# Patient Record
Sex: FEMALE | Race: WHITE | NOT HISPANIC OR LATINO
[De-identification: names, ages, dates, MRNs, and addresses within clinical notes are randomized per-mention and may not be internally consistent; named-entity substitution may affect disease eponyms.]

---

## 2020-02-20 PROBLEM — Z00.00 ENCOUNTER FOR PREVENTIVE HEALTH EXAMINATION: Status: ACTIVE | Noted: 2020-02-20

## 2020-04-14 ENCOUNTER — APPOINTMENT (OUTPATIENT)
Dept: INTERNAL MEDICINE | Facility: CLINIC | Age: 80
End: 2020-04-14

## 2021-01-20 ENCOUNTER — INPATIENT (INPATIENT)
Facility: HOSPITAL | Age: 81
LOS: 2 days | Discharge: HOME | DRG: 989 | End: 2021-01-23
Attending: HOSPITALIST | Admitting: HOSPITALIST
Payer: MEDICARE

## 2021-01-20 VITALS
DIASTOLIC BLOOD PRESSURE: 75 MMHG | HEIGHT: 63 IN | TEMPERATURE: 99 F | HEART RATE: 86 BPM | RESPIRATION RATE: 16 BRPM | SYSTOLIC BLOOD PRESSURE: 166 MMHG | OXYGEN SATURATION: 100 % | WEIGHT: 134.92 LBS

## 2021-01-20 LAB
ALBUMIN SERPL ELPH-MCNC: 3.8 G/DL — SIGNIFICANT CHANGE UP (ref 3.5–5)
ALP SERPL-CCNC: 67 U/L — SIGNIFICANT CHANGE UP (ref 40–120)
ALT FLD-CCNC: 21 U/L DA — SIGNIFICANT CHANGE UP (ref 10–60)
ANION GAP SERPL CALC-SCNC: 6 MMOL/L — SIGNIFICANT CHANGE UP (ref 5–17)
APPEARANCE UR: CLEAR — SIGNIFICANT CHANGE UP
APTT BLD: 24.7 SEC — LOW (ref 27.5–35.5)
AST SERPL-CCNC: 16 U/L — SIGNIFICANT CHANGE UP (ref 10–40)
BACTERIA # UR AUTO: ABNORMAL /HPF
BASOPHILS # BLD AUTO: 0.06 K/UL — SIGNIFICANT CHANGE UP (ref 0–0.2)
BASOPHILS NFR BLD AUTO: 0.7 % — SIGNIFICANT CHANGE UP (ref 0–2)
BILIRUB SERPL-MCNC: 0.3 MG/DL — SIGNIFICANT CHANGE UP (ref 0.2–1.2)
BILIRUB UR-MCNC: NEGATIVE — SIGNIFICANT CHANGE UP
BLD GP AB SCN SERPL QL: SIGNIFICANT CHANGE UP
BUN SERPL-MCNC: 25 MG/DL — HIGH (ref 7–18)
CALCIUM SERPL-MCNC: 9 MG/DL — SIGNIFICANT CHANGE UP (ref 8.4–10.5)
CHLORIDE SERPL-SCNC: 106 MMOL/L — SIGNIFICANT CHANGE UP (ref 96–108)
CO2 SERPL-SCNC: 28 MMOL/L — SIGNIFICANT CHANGE UP (ref 22–31)
COLOR SPEC: YELLOW — SIGNIFICANT CHANGE UP
CREAT SERPL-MCNC: 0.8 MG/DL — SIGNIFICANT CHANGE UP (ref 0.5–1.3)
DIFF PNL FLD: ABNORMAL
EOSINOPHIL # BLD AUTO: 0.16 K/UL — SIGNIFICANT CHANGE UP (ref 0–0.5)
EOSINOPHIL NFR BLD AUTO: 1.8 % — SIGNIFICANT CHANGE UP (ref 0–6)
EPI CELLS # UR: ABNORMAL /HPF
GLUCOSE SERPL-MCNC: 111 MG/DL — HIGH (ref 70–99)
GLUCOSE UR QL: NEGATIVE — SIGNIFICANT CHANGE UP
HCT VFR BLD CALC: 41.4 % — SIGNIFICANT CHANGE UP (ref 34.5–45)
HGB BLD-MCNC: 13.3 G/DL — SIGNIFICANT CHANGE UP (ref 11.5–15.5)
IMM GRANULOCYTES NFR BLD AUTO: 0.3 % — SIGNIFICANT CHANGE UP (ref 0–1.5)
INR BLD: 1.17 RATIO — HIGH (ref 0.88–1.16)
KETONES UR-MCNC: NEGATIVE — SIGNIFICANT CHANGE UP
LEUKOCYTE ESTERASE UR-ACNC: NEGATIVE — SIGNIFICANT CHANGE UP
LYMPHOCYTES # BLD AUTO: 1.49 K/UL — SIGNIFICANT CHANGE UP (ref 1–3.3)
LYMPHOCYTES # BLD AUTO: 16.7 % — SIGNIFICANT CHANGE UP (ref 13–44)
MCHC RBC-ENTMCNC: 28.7 PG — SIGNIFICANT CHANGE UP (ref 27–34)
MCHC RBC-ENTMCNC: 32.1 GM/DL — SIGNIFICANT CHANGE UP (ref 32–36)
MCV RBC AUTO: 89.4 FL — SIGNIFICANT CHANGE UP (ref 80–100)
MONOCYTES # BLD AUTO: 0.63 K/UL — SIGNIFICANT CHANGE UP (ref 0–0.9)
MONOCYTES NFR BLD AUTO: 7.1 % — SIGNIFICANT CHANGE UP (ref 2–14)
NEUTROPHILS # BLD AUTO: 6.55 K/UL — SIGNIFICANT CHANGE UP (ref 1.8–7.4)
NEUTROPHILS NFR BLD AUTO: 73.4 % — SIGNIFICANT CHANGE UP (ref 43–77)
NITRITE UR-MCNC: NEGATIVE — SIGNIFICANT CHANGE UP
NRBC # BLD: 0 /100 WBCS — SIGNIFICANT CHANGE UP (ref 0–0)
PH UR: 6.5 — SIGNIFICANT CHANGE UP (ref 5–8)
PLATELET # BLD AUTO: 193 K/UL — SIGNIFICANT CHANGE UP (ref 150–400)
POTASSIUM SERPL-MCNC: 4.2 MMOL/L — SIGNIFICANT CHANGE UP (ref 3.5–5.3)
POTASSIUM SERPL-SCNC: 4.2 MMOL/L — SIGNIFICANT CHANGE UP (ref 3.5–5.3)
PROT SERPL-MCNC: 8.3 G/DL — SIGNIFICANT CHANGE UP (ref 6–8.3)
PROT UR-MCNC: NEGATIVE — SIGNIFICANT CHANGE UP
PROTHROM AB SERPL-ACNC: 13.8 SEC — HIGH (ref 10.6–13.6)
RBC # BLD: 4.63 M/UL — SIGNIFICANT CHANGE UP (ref 3.8–5.2)
RBC # FLD: 14.6 % — HIGH (ref 10.3–14.5)
RBC CASTS # UR COMP ASSIST: ABNORMAL /HPF (ref 0–2)
SARS-COV-2 RNA SPEC QL NAA+PROBE: SIGNIFICANT CHANGE UP
SODIUM SERPL-SCNC: 140 MMOL/L — SIGNIFICANT CHANGE UP (ref 135–145)
SP GR SPEC: 1.01 — SIGNIFICANT CHANGE UP (ref 1.01–1.02)
TROPONIN I SERPL-MCNC: <0.015 NG/ML — SIGNIFICANT CHANGE UP (ref 0–0.04)
UROBILINOGEN FLD QL: NEGATIVE — SIGNIFICANT CHANGE UP
WBC # BLD: 8.92 K/UL — SIGNIFICANT CHANGE UP (ref 3.8–10.5)
WBC # FLD AUTO: 8.92 K/UL — SIGNIFICANT CHANGE UP (ref 3.8–10.5)
WBC UR QL: SIGNIFICANT CHANGE UP /HPF (ref 0–5)

## 2021-01-20 PROCEDURE — 70450 CT HEAD/BRAIN W/O DYE: CPT | Mod: 26

## 2021-01-20 RX ORDER — HALOPERIDOL DECANOATE 100 MG/ML
5 INJECTION INTRAMUSCULAR ONCE
Refills: 0 | Status: COMPLETED | OUTPATIENT
Start: 2021-01-20 | End: 2021-01-20

## 2021-01-20 RX ORDER — LIDOCAINE HYDROCHLORIDE AND EPINEPHRINE 10; 10 MG/ML; UG/ML
20 INJECTION, SOLUTION INFILTRATION; PERINEURAL ONCE
Refills: 0 | Status: COMPLETED | OUTPATIENT
Start: 2021-01-20 | End: 2021-01-20

## 2021-01-20 RX ADMIN — Medication 1 MILLIGRAM(S): at 21:21

## 2021-01-20 RX ADMIN — LIDOCAINE HYDROCHLORIDE AND EPINEPHRINE 20 MILLILITER(S): 10; 10 INJECTION, SOLUTION INFILTRATION; PERINEURAL at 17:51

## 2021-01-20 RX ADMIN — HALOPERIDOL DECANOATE 5 MILLIGRAM(S): 100 INJECTION INTRAMUSCULAR at 20:05

## 2021-01-20 NOTE — ED PROVIDER NOTE - CLINICAL SUMMARY MEDICAL DECISION MAKING FREE TEXT BOX
79 yo F s/p presumed mechanical fall. patient neurologically intact and ambulatory in the ED. Patient with billy-orbital hematoma and ecchymosis. CT with [facility]. Discussed with Neurosurgery and Trauma and [place] and currently no beds. Films reviewed and recommend repeat CAT scan in 6 hours. If worsening, can transfer. If unchanged, can admit to  and neurosurgery will come to  to [facility] tomorrow. Plan of care signed out to Dr. Phelps.

## 2021-01-20 NOTE — ED ADULT NURSE NOTE - CHIEF COMPLAINT QUOTE
S/p fall on sidewalk with laceration to left eyebrow and edema/ ecchymosis to left side of face. Pt unsure as to how she fell. elle friend [phone]

## 2021-01-20 NOTE — ED PROVIDER NOTE - OBJECTIVE STATEMENT
81 y/o F pt with no significant PMHx and no significant PSHx presents to the ED with c/o mechanical fall after tripping on a pebble today when going to her ENT office. As per EMS, patient was found with a left eyebrow laceration and ecchymosis. Patient was confused at the scene. Patient states not having pain except on her face. Patient denies any dizziness, photophobia, blurry vision, fevers, nausea, emesis, chest pain, shortness of breath, abdominal pain, dysuria, hematuria, diarrhea, constipation, or any other acute complaints. 79 y/o F pt with no significant PMHx and no significant PSHx presents to the ED with c/o mechanical fall after tripping on a pebble today when going to her ENT office. As per EMS, patient was found with a left eyebrow laceration and ecchymosis. EMS reports patient was confused at the scene. Patient states not having pain except on her face where her laceration is. Patient denies any dizziness, photophobia, blurry vision, fevers, nausea, emesis, chest pain, shortness of breath, abdominal pain, dysuria, hematuria, diarrhea, constipation, or any other acute complaints.

## 2021-01-20 NOTE — ED ADULT TRIAGE NOTE - CHIEF COMPLAINT QUOTE
S/p fall on sidewalk with laceration to left eyebrow and edema to left side of face. Pt unsure as to how she fell. elle friend [phone] S/p fall on sidewalk with laceration to left eyebrow and edema/ ecchymosis to left side of face. Pt unsure as to how she fell. elle friend [phone]

## 2021-01-20 NOTE — ED ADULT NURSE NOTE - OBJECTIVE STATEMENT
pt presents to ed s/p mechanical fall. pt endorses walking in the street and endorses falling but doesn't remember the mechanism. Endorses weak ankles b/l. Denies LOC., Pt sustained Lac to L eyebrow, contusion to L eye and swelling to L side of face. Denies blood thinners or LOC. Denies any other complaints.

## 2021-01-20 NOTE — ED ADULT NURSE NOTE - NSIMPLEMENTINTERV_GEN_ALL_ED
Implemented All Fall Risk Interventions:  [place] to call system. Call bell, personal items and telephone within reach. Instruct patient to call for assistance. Room bathroom lighting operational. Non-slip footwear when patient is off stretcher. Physically safe environment: no spills, clutter or unnecessary equipment. Stretcher in lowest position, wheels locked, appropriate side rails in place. Provide visual cue, wrist band, yellow gown, etc. Monitor gait and stability. Monitor for mental status changes and reorient to person, place, and time. Review medications for side effects contributing to fall risk. Reinforce activity limits and safety measures with patient and family.

## 2021-01-20 NOTE — ED PROVIDER NOTE - PROGRESS NOTE DETAILS
discussed with trauma, dr. Bebeto Perera and will discuss with neurosurgery, no beds at [place] discussed with neurosurgery, Dr. Geller, recommends repeat CAT scan in 6 hours. If unchanged, can admit to medicine at  and NS will come to [facility] to eval. If worsening will transfer to [place]. discussed with trauma, dr. Spangler and will discuss with neurosurgery, no beds at [place] discussed with neurosurgery, Dr. Geller, recommends repeat CAT scan in 6 hours. If unchanged, can admit to medicine at  and NS will come to [facility] to eval. Physician or PA will come to eval. If worsening will transfer to [place]. Patient agitated and roaming around the ED. disrupting nurses and physicians. Given haldol and ativan. patient sleeping soundly in bed. Plan of care signed out ot Dr. maddox. repeat CT head unchanged. will admit to medicine at  for observation.

## 2021-01-20 NOTE — ED PROVIDER NOTE - CHPI ED SYMPTOMS NEG
no dizziness, no photophobia, no blurry vision, no fever, no nausea, no emesis, no chest pain, no shortness of breath, no abdominal pain, no dysuria, no hematuria, no diarrhea, no constipation

## 2021-01-20 NOTE — ED ADULT NURSE NOTE - ED STAT RN HANDOFF DETAILS
pt.remained stable denies pain. admitted to [facility] for subarcnoid hemorrhage/facial laceration. on CM with NSR.  transfer to holding area. report given to david rogers.not in distress

## 2021-01-20 NOTE — ED PROVIDER NOTE - SKIN WOUND DESCRIPTION
left eyebrow and eyebrow with hematoma and ecchymosis, 2 cm laceration above the upper eyebrow 1.5cm left eyebrow laceration under left lateral eyebrow, surrounding ecchymosis and hematoma

## 2021-01-20 NOTE — ED ADULT TRIAGE NOTE - SPO2 (%)
Problem: Patient Care Overview  Goal: Plan of Care/Patient Progress Review  Pt is A&O x4. CMS intact. Bowel sounds audible. VSS. Dressing CDI, marked. NISREEN drain patent. Fountain also patent with 700 mL output. Up with 2 assist and GB. C/o Back and neck pain, decreased with scheduled Tylenol and PCA dilaudid. Has multiple skin tears/bruises on all extremities. Plan is wound consult today as well as PT/ OT.          100

## 2021-01-21 DIAGNOSIS — I60.9 NONTRAUMATIC SUBARACHNOID HEMORRHAGE, UNSPECIFIED: ICD-10-CM
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
## 2021-01-21 DIAGNOSIS — Z29.9 ENCOUNTER FOR PROPHYLACTIC MEASURES, UNSPECIFIED: ICD-10-CM

## 2021-01-21 LAB
A1C WITH ESTIMATED AVERAGE GLUCOSE RESULT: 5.6 % — SIGNIFICANT CHANGE UP (ref 4–5.6)
ALBUMIN SERPL ELPH-MCNC: 3.5 G/DL — SIGNIFICANT CHANGE UP (ref 3.5–5)
ALP SERPL-CCNC: 64 U/L — SIGNIFICANT CHANGE UP (ref 40–120)
ALT FLD-CCNC: 20 U/L DA — SIGNIFICANT CHANGE UP (ref 10–60)
ANION GAP SERPL CALC-SCNC: 9 MMOL/L — SIGNIFICANT CHANGE UP (ref 5–17)
AST SERPL-CCNC: 18 U/L — SIGNIFICANT CHANGE UP (ref 10–40)
BASOPHILS # BLD AUTO: 0.06 K/UL — SIGNIFICANT CHANGE UP (ref 0–0.2)
BASOPHILS NFR BLD AUTO: 0.6 % — SIGNIFICANT CHANGE UP (ref 0–2)
BILIRUB SERPL-MCNC: 0.5 MG/DL — SIGNIFICANT CHANGE UP (ref 0.2–1.2)
BUN SERPL-MCNC: 16 MG/DL — SIGNIFICANT CHANGE UP (ref 7–18)
CALCIUM SERPL-MCNC: 8.6 MG/DL — SIGNIFICANT CHANGE UP (ref 8.4–10.5)
CHLORIDE SERPL-SCNC: 107 MMOL/L — SIGNIFICANT CHANGE UP (ref 96–108)
CHOLEST SERPL-MCNC: 185 MG/DL — SIGNIFICANT CHANGE UP
CK MB BLD-MCNC: 1.7 % — SIGNIFICANT CHANGE UP (ref 0–3.5)
CK MB CFR SERPL CALC: 1.9 NG/ML — SIGNIFICANT CHANGE UP (ref 0–3.6)
CK SERPL-CCNC: 115 U/L — SIGNIFICANT CHANGE UP (ref 21–215)
CO2 SERPL-SCNC: 23 MMOL/L — SIGNIFICANT CHANGE UP (ref 22–31)
CREAT SERPL-MCNC: 0.62 MG/DL — SIGNIFICANT CHANGE UP (ref 0.5–1.3)
CULTURE RESULTS: SIGNIFICANT CHANGE UP
EOSINOPHIL # BLD AUTO: 0.05 K/UL — SIGNIFICANT CHANGE UP (ref 0–0.5)
EOSINOPHIL NFR BLD AUTO: 0.5 % — SIGNIFICANT CHANGE UP (ref 0–6)
ESTIMATED AVERAGE GLUCOSE: 114 MG/DL — SIGNIFICANT CHANGE UP (ref 68–114)
FOLATE SERPL-MCNC: >20 NG/ML — SIGNIFICANT CHANGE UP
GLUCOSE SERPL-MCNC: 111 MG/DL — HIGH (ref 70–99)
HCT VFR BLD CALC: 38.8 % — SIGNIFICANT CHANGE UP (ref 34.5–45)
HDLC SERPL-MCNC: 77 MG/DL — SIGNIFICANT CHANGE UP
HGB BLD-MCNC: 12.6 G/DL — SIGNIFICANT CHANGE UP (ref 11.5–15.5)
IMM GRANULOCYTES NFR BLD AUTO: 0.6 % — SIGNIFICANT CHANGE UP (ref 0–1.5)
INR BLD: 1.22 RATIO — HIGH (ref 0.88–1.16)
LIPID PNL WITH DIRECT LDL SERPL: 94 MG/DL — SIGNIFICANT CHANGE UP
LYMPHOCYTES # BLD AUTO: 1.07 K/UL — SIGNIFICANT CHANGE UP (ref 1–3.3)
LYMPHOCYTES # BLD AUTO: 10.4 % — LOW (ref 13–44)
MAGNESIUM SERPL-MCNC: 2.2 MG/DL — SIGNIFICANT CHANGE UP (ref 1.6–2.6)
MCHC RBC-ENTMCNC: 29.1 PG — SIGNIFICANT CHANGE UP (ref 27–34)
MCHC RBC-ENTMCNC: 32.5 GM/DL — SIGNIFICANT CHANGE UP (ref 32–36)
MCV RBC AUTO: 89.6 FL — SIGNIFICANT CHANGE UP (ref 80–100)
MONOCYTES # BLD AUTO: 0.5 K/UL — SIGNIFICANT CHANGE UP (ref 0–0.9)
MONOCYTES NFR BLD AUTO: 4.9 % — SIGNIFICANT CHANGE UP (ref 2–14)
NEUTROPHILS # BLD AUTO: 8.54 K/UL — HIGH (ref 1.8–7.4)
NEUTROPHILS NFR BLD AUTO: 83 % — HIGH (ref 43–77)
NON HDL CHOLESTEROL: 108 MG/DL — SIGNIFICANT CHANGE UP
NRBC # BLD: 0 /100 WBCS — SIGNIFICANT CHANGE UP (ref 0–0)
PHOSPHATE SERPL-MCNC: 2.6 MG/DL — SIGNIFICANT CHANGE UP (ref 2.5–4.5)
PLATELET # BLD AUTO: 177 K/UL — SIGNIFICANT CHANGE UP (ref 150–400)
POTASSIUM SERPL-MCNC: 3.8 MMOL/L — SIGNIFICANT CHANGE UP (ref 3.5–5.3)
POTASSIUM SERPL-SCNC: 3.8 MMOL/L — SIGNIFICANT CHANGE UP (ref 3.5–5.3)
PROLACTIN SERPL-MCNC: 27.2 NG/ML — HIGH (ref 3.4–24.1)
PROT SERPL-MCNC: 7.5 G/DL — SIGNIFICANT CHANGE UP (ref 6–8.3)
PROTHROM AB SERPL-ACNC: 14.4 SEC — HIGH (ref 10.6–13.6)
RBC # BLD: 4.33 M/UL — SIGNIFICANT CHANGE UP (ref 3.8–5.2)
RBC # FLD: 14.3 % — SIGNIFICANT CHANGE UP (ref 10.3–14.5)
SARS-COV-2 IGG SERPL QL IA: NEGATIVE — SIGNIFICANT CHANGE UP
SARS-COV-2 IGM SERPL IA-ACNC: <0.1 INDEX — SIGNIFICANT CHANGE UP
SODIUM SERPL-SCNC: 139 MMOL/L — SIGNIFICANT CHANGE UP (ref 135–145)
SPECIMEN SOURCE: SIGNIFICANT CHANGE UP
TRIGL SERPL-MCNC: 68 MG/DL — SIGNIFICANT CHANGE UP
TROPONIN I SERPL-MCNC: <0.015 NG/ML — SIGNIFICANT CHANGE UP (ref 0–0.04)
TSH SERPL-MCNC: 1.7 UU/ML — SIGNIFICANT CHANGE UP (ref 0.34–4.82)
VIT B12 SERPL-MCNC: 850 PG/ML — SIGNIFICANT CHANGE UP (ref 232–1245)
WBC # BLD: 10.28 K/UL — SIGNIFICANT CHANGE UP (ref 3.8–10.5)
WBC # FLD AUTO: 10.28 K/UL — SIGNIFICANT CHANGE UP (ref 3.8–10.5)

## 2021-01-21 PROCEDURE — 70450 CT HEAD/BRAIN W/O DYE: CPT | Mod: 26

## 2021-01-21 PROCEDURE — 99285 EMERGENCY DEPT VISIT HI MDM: CPT

## 2021-01-21 PROCEDURE — 99222 1ST HOSP IP/OBS MODERATE 55: CPT

## 2021-01-21 RX ORDER — ONDANSETRON 8 MG/1
4 TABLET, FILM COATED ORAL EVERY 6 HOURS
Refills: 0 | Status: DISCONTINUED | OUTPATIENT
Start: 2021-01-21 | End: 2021-01-23

## 2021-01-21 RX ORDER — PANTOPRAZOLE SODIUM 20 MG/1
40 TABLET, DELAYED RELEASE ORAL
Refills: 0 | Status: DISCONTINUED | OUTPATIENT
Start: 2021-01-21 | End: 2021-01-23

## 2021-01-21 RX ORDER — SODIUM CHLORIDE 9 MG/ML
1000 INJECTION, SOLUTION INTRAVENOUS
Refills: 0 | Status: DISCONTINUED | OUTPATIENT
Start: 2021-01-21 | End: 2021-01-21

## 2021-01-21 RX ADMIN — PANTOPRAZOLE SODIUM 40 MILLIGRAM(S): 20 TABLET, DELAYED RELEASE ORAL at 05:37

## 2021-01-21 RX ADMIN — SODIUM CHLORIDE 100 MILLILITER(S): 9 INJECTION, SOLUTION INTRAVENOUS at 04:00

## 2021-01-21 NOTE — H&P ADULT - HISTORY OF PRESENT ILLNESS
81 y/o female from home, lives with a friend, no family, independent, with no significant medical history and no significant PSHx presents to the ED with c/o mechanical fall after tripping on a pebble today when walking on street. As per EMS, patient was found with a left eyebrow laceration and ecchymosis. EMS reports patient was confused at the scene. At present, patient is drowsy, says she is sleepy, feels like as if she was drugged. She states she has headache and feels nauseous. She denies taking any medications at home. Patient was planning on going to PMD on monday, hasn not seen doctor in many years. Denies having lightheadedness, vertigo, palpitations, chest pain before falling. Patient denies any dizziness, photophobia, blurry vision, fevers, nausea, emesis, chest pain, shortness of breath, abdominal pain, dysuria, hematuria, diarrhea, constipation.

## 2021-01-21 NOTE — H&P ADULT - PROBLEM SELECTOR PLAN 2
Presented after a fall  She states she tripped, no prodromal symptom  Has ecchymosis of left eyelid  Will do PT

## 2021-01-21 NOTE — H&P ADULT - PROBLEM SELECTOR PLAN 1
Presented after a fall, hit head, had laceration of left eyelid  Has ecchymosis of left eyelid  Patient is lethargic at present, received haldol and ativan in ed earlier as she was walking around.  CT head showed subarachnoid hgg, repeat Ct was done after few hours, which shows stable hgg compared to previous.   Will monitor on tele   Started on IV fluids  NPO for now as she is lethargic and nauseous  neuro Dr Ovalle

## 2021-01-21 NOTE — PATIENT PROFILE ADULT - NS PRO AD NO ADVANCE DIRECTIVE
Aperture Size (Optional): D No Number Of Freeze-Thaw Cycles: 1 freeze-thaw cycle Detail Level: Detailed Render Note In Bullet Format When Appropriate: No Post-Care Instructions: I reviewed with the patient in detail post-care instructions. Patient is to wear sunprotection, and avoid picking at any of the treated lesions. Pt may apply Vaseline to crusted or scabbing areas. Consent: The patient's consent was obtained including but not limited to risks of crusting, scabbing, blistering, scarring, darker or lighter pigmentary change, recurrence, incomplete removal and infection. Duration Of Freeze Thaw-Cycle (Seconds): 5

## 2021-01-21 NOTE — H&P ADULT - NSHPPHYSICALEXAM_GEN_ALL_CORE
Vital Signs (24 Hrs):  T(C): 36.6 (01-20-21 @ 20:44), Max: 37.4 (01-20-21 @ 16:27)  HR: 85 (01-20-21 @ 20:44) (85 - 86)  BP: 130/81 (01-20-21 @ 20:44) (130/81 - 166/75)  RR: 17 (01-20-21 @ 20:44) (16 - 17)  SpO2: 96% (01-20-21 @ 20:44) (96% - 100%)

## 2021-01-21 NOTE — CONSULT NOTE ADULT - ASSESSMENT
Extrapyramidal type gait disorder causing postural unsteadiness and fall; SDH without significant pressure effect; plan follow CT head; physical therpay for gait training; advise strongly walker (rollator 3 wheeled walker)

## 2021-01-21 NOTE — H&P ADULT - ASSESSMENT
79 y/o female from home, lives with a friend, no family, independent, with no significant medical history and no significant PSHx presents to the ED with c/o mechanical fall after tripping on a pebble today when walking on street.      ED:  CT head showed scattered areas of subarachnoid hemorrhage along both hemispheres right greater than left. No subdural or epidural hematoma.  Repeat Ct head showed similar findings.  Tele Neuro surgery was consulted. As per ED Attending note "discussed with trauma, dr. Spangler and will discuss with neurosurgery [Dr. Ash] neurosurgery will come to  to eval tomorrow".  She received haldol and ativan in ED as she was talking more.     Patient is being admitted to tele for monitoring of Subarachnoid hemorrhage.

## 2021-01-21 NOTE — CONSULT NOTE ADULT - SUBJECTIVE AND OBJECTIVE BOX
Patient is a 80y old  Female who presents with a chief complaint of fall (21 Jan 2021 02:21)      HPI: Fall when heading to ENT resulting in left periorbital injury laceration and ecchymoses. She does not understand how she fell; she blames it on the type of shoes she was wearing. She cannot see with the left eye because the eyelid is shut due to swelling after the injury/    PAST MEDICAL & SURGICAL HISTORY:  No pertinent past medical history    No significant past surgical history        FAMILY HISTORY:        Social Hx:  Nonsmoker, no drug or alcohol use    MEDICATIONS  (STANDING):  pantoprazole  Injectable 40 milliGRAM(s) IV Push two times a day       Allergies    tetanus toxoid (Rash)    Intolerances        ROS: Pertinent positives in HPI, all other ROS were reviewed and are negative.      Vital Signs Last 24 Hrs  T(C): 36.3 (21 Jan 2021 11:15), Max: 37.4 (20 Jan 2021 16:27)  T(F): 97.3 (21 Jan 2021 11:15), Max: 99.3 (20 Jan 2021 16:27)  HR: 79 (21 Jan 2021 11:15) (78 - 89)  BP: 137/84 (21 Jan 2021 11:15) (130/81 - 166/75)  BP(mean): --  RR: 17 (21 Jan 2021 11:15) (16 - 18)  SpO2: 98% (21 Jan 2021 11:15) (96% - 100%)        Constitutional: awake and alert.  HEENT: PERRLA, EOMI,   Neck: Supple.  Respiratory: Breath sounds are clear bilaterally  Cardiovascular: S1 and S2, regular / rhythm  Gastrointestinal: soft, nontender  Extremities:  no edema  Vascular: Caritid Bruit - no  Musculoskeletal: no joint swelling/tenderness, no abnormal movements  Skin: No rashes    Neurological exam:  HF: A x O x 3. Appropriately interactive, normal affect. Speech fluent, No Aphasia or paraphasic errors. Naming /repetition intact   CN: AI, EOMI, VFF, facial sensation normal, no NLFD, tongue midline, Palate moves equally, SCM equal bilaterally  Motor: No pronator drift, Strength 5/5 in all 4 ext, normal bulk and tone, no tremor, rigidity or bradykinesia.    Sens: Intact to light touch / PP/ VS/    Reflexes: Symmetric and normal . BJ 2+, BR 2+, KJ 2+, AJ 1+, downgoing toes b/l  Coord:  No FNFA, dysmetria, ELVIE slow  Gait/Balance: Narrow stance short steps; propulsion and retropulsion + Romberg unsteady    NIHSS: 0          Labs:                        12.6   10.28 )-----------( 177      ( 21 Jan 2021 04:55 )             38.8     01-21    139  |  107  |  16  ----------------------------<  111<H>  3.8   |  23  |  0.62    Ca    8.6      21 Jan 2021 04:55  Phos  2.6     01-21  Mg     2.2     01-21    TPro  7.5  /  Alb  3.5  /  TBili  0.5  /  DBili  x   /  AST  18  /  ALT  20  /  AlkPhos  64  01-21 01-21 Chol 185 LDL -- HDL 77 Trig 68  PT/INR - ( 21 Jan 2021 04:55 )   PT: 14.4 sec;   INR: 1.22 ratio         PTT - ( 20 Jan 2021 19:10 )  PTT:24.7 sec    Radiology report:  - CT head:    < from: CT Head No Cont (01.21.21 @ 01:36) >    EXAM:  CT BRAIN                            PROCEDURE DATE:  01/21/2021          INTERPRETATION:  CT HEAD WITHOUT CONTRAST    INDICATION: 80 years old. Female. SAH    repeat 6 hr scan repeat at 1/21/21 at 1:15AM.    COMPARISON: 1/20/2021 6:21 PM.    TECHNIQUE: Noncontrast axial CT head was obtained from the skull base to vertex.    FINDINGS:  Scattered subarachnoid hemorrhage is unchanged, right more than left. No hydrocephalus.  No mass effect or midline shift.  No CT evidence of acute large vascular territory infarct.  The ventricles and cortical sulci are prominent reflecting parenchymal volume loss.  Scattered hypodensities in the periventricular white matter are nonspecific, but likely sequela of small vessel ischemic disease.    The visualized paranasal sinuses and mastoid air cells are well aerated.  No displaced calvarial fracture.    IMPRESSION:  Scattered subarachnoid hemorrhage, right more than left, is unchanged.      GODL OVALLES MD; Attending Radiologist  This document has been electronically signed. Jan 21 2021  2:07AM    < end of copied text >

## 2021-01-21 NOTE — CHART NOTE - NSCHARTNOTEFT_GEN_A_CORE
79 y/o female from home, lives with a friend, no family, independent, with no significant medical history and no significant PSHx presents to the ED with fcial ecchimosis, laceration of left eyelid  sp mechanical fall on street, ht head, no LOC   In ED: CT head showed scattered areas of subarachnoid hemorrhage along both hemispheres right greater than left. No subdural or epidural hematoma. Repeat Ct head showed similar findings.   Pt was admitted to telemetry for monitoring of subarachnoid hemorrhage   Neurosurgery and neurology consulted by ED   Pt seen at bedside this morning, states is feeling fine, co mild,  generalized pain to arms and legs,  alert and oriented x 4, walks around with steady gait, denies headache, dizziness, neck pain.           OBJECTIVE:  Vital Signs Last 24 Hrs  T(C): 36.3 (21 Jan 2021 11:15), Max: 37.4 (20 Jan 2021 16:27)  T(F): 97.3 (21 Jan 2021 11:15), Max: 99.3 (20 Jan 2021 16:27)  HR: 79 (21 Jan 2021 11:15) (78 - 89)  BP: 137/84 (21 Jan 2021 11:15) (130/81 - 166/75)  BP(mean): --  RR: 17 (21 Jan 2021 11:15) (16 - 18)  SpO2: 98% (21 Jan 2021 11:15) (96% - 100%)    FOCUSED PHYSICAL EXAM:  Neuro: awake, alert, oriented x 3. No neuro deficit, steady gait   Cardiovascular: Pulses +2 B/L in lower and upper extremities, HR regular, BP stable, No edema.  Respiratory: Respirations regular, unlabored, breath sounds clear B/L.   GI: Abdomen soft, non-tender, positive bowel sounds.  : no bladder distention noted. No complaints at this time.  Skin: facial ecchymosis, left periorbital ecchymosis, sp left eye lid lac repair     I&O's      LABS:                        12.6   10.28 )-----------( 177      ( 21 Jan 2021 04:55 )             38.8   CARDIAC MARKERS ( 21 Jan 2021 04:55 )  <0.015 ng/mL / x     / 115 U/L / x     / 1.9 ng/mL  CARDIAC MARKERS ( 20 Jan 2021 17:40 )  <0.015 ng/mL / x     / x     / x     / x        01-21    139  |  107  |  16  ----------------------------<  111<H>  3.8   |  23  |  0.62    Ca    8.6      21 Jan 2021 04:55  Phos  2.6     01-21  Mg     2.2     01-21    TPro  7.5  /  Alb  3.5  /  TBili  0.5  /  DBili  x   /  AST  18  /  ALT  20  /  AlkPhos  64  01-21        ASSESSMENT:    79 y/o female from home, lives with a friend, no family, independent, with no significant medical history and no significant PSHx presents to the ED with fcial ecchimosis, laceration of left eyelid  sp mechanical fall on street, ht head, no LOC   In ED: CT head showed scattered areas of subarachnoid hemorrhage along both hemispheres right greater than left. No subdural or epidural hematoma. Repeat Ct head showed similar findings.   Pt was admitted to telemetry for monitoring of subarachnoid hemorrhage     PLAN   1. Subarachnoid hemorrhage.    repeat CT head tomorrow in am to ensure stability   monitor on Telemetry x 24 hrs   maintain Head of Bed elevated to  30-45 degrees  supportive treatment with zofran and tylenol  for pain as needed   PT Consult   Neuro Checks every 4 hrs   Fall Precautions, Aspiration Precautions, Seizure Precautions  neurology Dr Ovalle on board   pending neurosurgery eval Dr. Ash    2. Prophylactic measure.    Intermittent Compression Stockings  Pepcid. 81 y/o female from home, lives with a friend, no family, independent, with no significant medical history and no significant PSHx presents to the ED with facial ecchymosis, laceration of left eyelid  sp mechanical fall on street, ht head, no LOC   In ED: CT head showed scattered areas of subarachnoid hemorrhage along both hemispheres right greater than left. No subdural or epidural hematoma. Repeat Ct head showed similar findings.   Pt was admitted to telemetry for monitoring of subarachnoid hemorrhage   Neurosurgery and neurology consulted by ED   Pt seen at bedside this morning, states is feeling fine, co mild,  generalized pain to arms and legs,  alert and oriented x 4, walks around with steady gait, denies headache, dizziness, neck pain.           OBJECTIVE:  Vital Signs Last 24 Hrs  T(C): 36.3 (21 Jan 2021 11:15), Max: 37.4 (20 Jan 2021 16:27)  T(F): 97.3 (21 Jan 2021 11:15), Max: 99.3 (20 Jan 2021 16:27)  HR: 79 (21 Jan 2021 11:15) (78 - 89)  BP: 137/84 (21 Jan 2021 11:15) (130/81 - 166/75)  BP(mean): --  RR: 17 (21 Jan 2021 11:15) (16 - 18)  SpO2: 98% (21 Jan 2021 11:15) (96% - 100%)    FOCUSED PHYSICAL EXAM:  Neuro: awake, alert, oriented x 3. No neuro deficit, steady gait   Cardiovascular: Pulses +2 B/L in lower and upper extremities, HR regular, BP stable, No edema.  Respiratory: Respirations regular, unlabored, breath sounds clear B/L.   GI: Abdomen soft, non-tender, positive bowel sounds.  : no bladder distention noted. No complaints at this time.  Skin: facial ecchymosis, left periorbital ecchymosis, sp left eye lid lac repair     I&O's      LABS:                        12.6   10.28 )-----------( 177      ( 21 Jan 2021 04:55 )             38.8   CARDIAC MARKERS ( 21 Jan 2021 04:55 )  <0.015 ng/mL / x     / 115 U/L / x     / 1.9 ng/mL  CARDIAC MARKERS ( 20 Jan 2021 17:40 )  <0.015 ng/mL / x     / x     / x     / x        01-21    139  |  107  |  16  ----------------------------<  111<H>  3.8   |  23  |  0.62    Ca    8.6      21 Jan 2021 04:55  Phos  2.6     01-21  Mg     2.2     01-21    TPro  7.5  /  Alb  3.5  /  TBili  0.5  /  DBili  x   /  AST  18  /  ALT  20  /  AlkPhos  64  01-21        ASSESSMENT:    81 y/o female from home, lives with a friend, no family, independent, with no significant medical history and no significant PSHx presents to the ED with fcial ecchimosis, laceration of left eyelid  sp mechanical fall on street, ht head, no LOC   In ED: CT head showed scattered areas of subarachnoid hemorrhage along both hemispheres right greater than left. No subdural or epidural hematoma. Repeat Ct head showed similar findings.   Pt was admitted to telemetry for monitoring of subarachnoid hemorrhage     PLAN   1. Subarachnoid hemorrhage.    repeat CT head tomorrow in am to ensure stability   monitor on Telemetry x 24 hrs   maintain Head of Bed elevated to  30-45 degrees  supportive treatment with zofran and tylenol  for pain as needed   PT Consult   Neuro Checks every 4 hrs   Fall Precautions, Aspiration Precautions, Seizure Precautions  neurology Dr Ovalle on board   pending neurosurgery eval Dr. Ash as needed     2. Prophylactic measure.    Intermittent Compression Stockings  Pepcid.

## 2021-01-22 ENCOUNTER — TRANSCRIPTION ENCOUNTER (OUTPATIENT)
Age: 81
End: 2021-01-22

## 2021-01-22 PROCEDURE — 70450 CT HEAD/BRAIN W/O DYE: CPT | Mod: 26

## 2021-01-22 PROCEDURE — 82553 CREATINE MB FRACTION: CPT

## 2021-01-22 PROCEDURE — 82746 ASSAY OF FOLIC ACID SERUM: CPT

## 2021-01-22 PROCEDURE — 87635 SARS-COV-2 COVID-19 AMP PRB: CPT

## 2021-01-22 PROCEDURE — 97161 PT EVAL LOW COMPLEX 20 MIN: CPT

## 2021-01-22 PROCEDURE — 81001 URINALYSIS AUTO W/SCOPE: CPT

## 2021-01-22 PROCEDURE — 82607 VITAMIN B-12: CPT

## 2021-01-22 PROCEDURE — 93005 ELECTROCARDIOGRAM TRACING: CPT

## 2021-01-22 PROCEDURE — 84484 ASSAY OF TROPONIN QUANT: CPT

## 2021-01-22 PROCEDURE — 83036 HEMOGLOBIN GLYCOSYLATED A1C: CPT

## 2021-01-22 PROCEDURE — 84100 ASSAY OF PHOSPHORUS: CPT

## 2021-01-22 PROCEDURE — 87086 URINE CULTURE/COLONY COUNT: CPT

## 2021-01-22 PROCEDURE — 83735 ASSAY OF MAGNESIUM: CPT

## 2021-01-22 PROCEDURE — 86901 BLOOD TYPING SEROLOGIC RH(D): CPT

## 2021-01-22 PROCEDURE — 96375 TX/PRO/DX INJ NEW DRUG ADDON: CPT

## 2021-01-22 PROCEDURE — 80061 LIPID PANEL: CPT

## 2021-01-22 PROCEDURE — 86850 RBC ANTIBODY SCREEN: CPT

## 2021-01-22 PROCEDURE — 96374 THER/PROPH/DIAG INJ IV PUSH: CPT

## 2021-01-22 PROCEDURE — 85610 PROTHROMBIN TIME: CPT

## 2021-01-22 PROCEDURE — 84443 ASSAY THYROID STIM HORMONE: CPT

## 2021-01-22 PROCEDURE — 99232 SBSQ HOSP IP/OBS MODERATE 35: CPT

## 2021-01-22 PROCEDURE — 99285 EMERGENCY DEPT VISIT HI MDM: CPT | Mod: 25

## 2021-01-22 PROCEDURE — 85730 THROMBOPLASTIN TIME PARTIAL: CPT

## 2021-01-22 PROCEDURE — 84146 ASSAY OF PROLACTIN: CPT

## 2021-01-22 PROCEDURE — 99239 HOSP IP/OBS DSCHRG MGMT >30: CPT

## 2021-01-22 PROCEDURE — 86769 SARS-COV-2 COVID-19 ANTIBODY: CPT

## 2021-01-22 PROCEDURE — 80053 COMPREHEN METABOLIC PANEL: CPT

## 2021-01-22 PROCEDURE — 86900 BLOOD TYPING SEROLOGIC ABO: CPT

## 2021-01-22 PROCEDURE — 85025 COMPLETE CBC W/AUTO DIFF WBC: CPT

## 2021-01-22 PROCEDURE — U0005: CPT

## 2021-01-22 PROCEDURE — 70450 CT HEAD/BRAIN W/O DYE: CPT

## 2021-01-22 PROCEDURE — 36415 COLL VENOUS BLD VENIPUNCTURE: CPT

## 2021-01-22 PROCEDURE — 82550 ASSAY OF CK (CPK): CPT

## 2021-01-22 NOTE — DISCHARGE NOTE PROVIDER - HOSPITAL COURSE
81 y/o female from home, lives with a friend, no family, independent, with no significant medical history and no significant PSHx presents to the ED with c/o mechanical fall after tripping on a pebble today when walking on street. As per EMS, patient was found with a left eyebrow laceration and ecchymosis. EMS reports patient was confused at the scene. At present, patient is drowsy, says she is sleepy, feels like as if she was drugged. She states she has headache and feels nauseous. She denies taking any medications at home. Patient was planning on going to PMD on monday, hasn not seen doctor in many years. Denies having lightheadedness, vertigo, palpitations, chest pain before falling. Patient denies any dizziness, photophobia, blurry vision, fevers, nausea, emesis, chest pain, shortness of breath, abdominal pain, dysuria, hematuria, diarrhea, constipation. 79 y/o female from home, lives with a friend, no family, independent, with no significant medical history and no significant PSHx presents to the ED with c/o mechanical fall after tripping on a pebble today when walking on street. As per EMS, patient was found with a left eyebrow laceration and ecchymosis. EMS reports patient was confused at the scene. At present, patient is drowsy, says she is sleepy, feels like as if she was drugged. She states she has headache and feels nauseous. She denies taking any medications at home. Patient was planning on going to PMD on monday, hasn not seen doctor in many years. Denies having lightheadedness, vertigo, palpitations, chest pain before falling. Patient denies any dizziness, photophobia, blurry vision, fevers, nausea, emesis, chest pain, shortness of breath, abdominal pain, dysuria, hematuria, diarrhea, constipation.      in ED CT head showed scattered areas of subarachnoid hemorrhage along both hemispheres right greater than left. No subdural or epidural hematoma.  Repeat Ct head showed similar findings with improvement . Tele Neuro surgery was consulted. patient was closely monitored , patient condition improved. patient was evaluated by PT that recommended home PT with a walker.   ·

## 2021-01-22 NOTE — DISCHARGE NOTE PROVIDER - CARE PROVIDER_API CALL
Maribeth Ovalle (MD)  Clinical Neurophysiology; Neurology  [address] [facility] [facility], [address] [place] [address]  Phone: [phone]  Fax: [phone]  Follow Up Time:

## 2021-01-22 NOTE — PHYSICAL THERAPY INITIAL EVALUATION ADULT - TRANSFER SKILLS, REHAB EVAL
[facility]  INTERVENTIONAL CARDIOLOGY    OFFICE PROGRESS NOTE      Name:  Ervin Grant  : 1940    Date of consultation:   2019    Referring physician: Jaron Panchal DO    Reason for Visit:  Follow-up diastolic heart failure    HPI:   Last seen March, new swelling and started on lasix.    Edema has improved since he started on Lasix.  No shortness of breath or chest pain.  Labs were drawn after initiation of Lasix and creatinine was stable.  LDL also unchanged, in the 20s.    CARDIAC HISTORY:   · CAD s/p PCI LAD and diagonal  with HOWARD  · Echo 2019 normal EF, no sig valve disease  · HTN, HPL (LDL 2019 22), DM     ASSESSMENT AND RECOMMENDATIONS:   · Diastolic heart failure: Improved since on Lasix, blood pressure controlled.  No changes to meds    · Coronary artery disease: No exertional symptoms, continue aspirin and Brilinta, ejection fraction normal    · Hypertension: Controlled on current regimen, no changes.    · Hyperlipidemia: Deserves high intensity statin, continue atorvastatin 80 mg daily.    Routine follow-up 1 year      Henry Moon MD  2019    -------------------------------------    Current Outpatient Medications   Medication Sig Dispense Refill   • furosemide (LASIX) 40 MG tablet TAKE 1 TABLET BY MOUTH EVERY DAY 90 tablet 1   • amLODIPine (NORVASC) 5 MG tablet Take 5 mg by mouth daily. 1 daily      • atorvastatin (LIPITOR) 80 MG tablet Take 80 mg by mouth daily.     • finasteride (PROSCAR) 5 MG tablet Take 5 mg by mouth daily. 1 daily      • metformin (GLUCOPHAGE-XR) 500 MG 24 hr tablet Take 500 mg by mouth daily (with breakfast). 1 daily      • metoPROLOL tartrate (LOPRESSOR) 25 MG tablet Take 25 mg by mouth daily. HALF TAB DAILY     • tamsulosin (FLOMAX) 0.4 MG Cap Take 0.4 mg by mouth daily. 1 daily      • valsartan (DIOVAN) 320 MG tablet Take 320 mg by mouth daily. 1 daily      • aspirin 81 MG tablet Take 81 mg by mouth nightly. 1 TABLET BY MOUTH  AT BEDTIME      • ticagrelor (BRILINTA) 90 MG Tab Take 1 tablet by mouth 2 times daily.     • cloNIDine (CATAPRES-TTS-1) 0.1 MG/24HR APPLY 1 PATCH WEEKLY AS DIRECTED.       No current facility-administered medications for this visit.      Family History:  Family History   Problem Relation Age of Onset   • Coronary Artery Disease Neg Hx          Negative for premature CAD.   • Aneurysm Neg Hx         Negative for AAA.      Past Medical History:   Diagnosis Date   • CAD (coronary artery disease) 3/28/2019   • Essential hypertension    • Hyperlipidemia 3/28/2019   • Hypertension 3/28/2019      Social History:  Social History     Tobacco Use   • Smoking status: Former Smoker   • Smokeless tobacco: Former User   • Tobacco comment: Former tobacco use. used to smoke but quit and 2007   Substance Use Topics   • Alcohol use: Not Currently     Frequency: Never     Comment: denies drinking and quit 2007   • Drug use: Not on file        ROS:   GENERAL HEALTH: no fevers, chills, sweats  SKIN: no unusual skin lesions or rashes  RESPIRATORY: no cough or shortness of breath  CARDIOVASCULAR: no claudication, see HPI  GI: no abdominal pain or heartburn  NEURO: no headaches, no focal weakness or paresthesias  All other systems reviewed and negative.    EXAM:     Visit Vitals  /80 ([place] [facility] [place]: Supine, Cuff Size: Regular)   Pulse 65   Ht 5' 8\" (1.727 m)   Wt 75.8 kg (167 lb)   SpO2 97%   BMI 25.39 kg/m²     GENERAL: in no apparent distress  HEENT: atraumatic, normocephalic  NECK: no JVD, normal carotid pulses without bruits  LUNGS: normal excursion, clear to auscultation bilaterally  HEART: RRR, nl S1/S2, no gallop, no murmur, no rub  ABD: soft, nontender, nondistended, normal bowel sounds  EXTREMITIES: no cyanosis, clubbing or edema  SKIN: warm, dry, normal turgor  NEURO: alert, oriented x3, symmetrical face, no dysarthria, no focal deficits  PSYCH: cooperative, calm             independent

## 2021-01-22 NOTE — PROGRESS NOTE ADULT - SUBJECTIVE AND OBJECTIVE BOX
PGY-1 Progress Note discussed with attending    PAGER #: [phone]] TILL 5:00 PM  PLEASE CONTACT ON CALL TEAM:  - On Call Team (Please refer to Contreras) FROM 5:00 PM - 8:30PM  - Nightfloat Team FROM 8:30 -7:30 AM    CHIEF COMPLAINT & BRIEF HOSPITAL COURSE:  81 y/o female from home, lives with a friend, no family, independent, with no significant medical history and no significant PSHx presents to the ED with c/o mechanical fall after tripping on a pebble today when walking on street. As per EMS, patient was found with a left eyebrow laceration and ecchymosis. EMS reports patient was confused at the scene. At present, patient is drowsy, says she is sleepy, feels like as if she was drugged. She states she has headache and feels nauseous. She denies taking any medications at home. Patient was planning on going to PMD on monday, hasn not seen doctor in many years. Denies having lightheadedness, vertigo, palpitations, chest pain before falling. Patient denies any dizziness, photophobia, blurry vision, fevers, nausea, emesis, chest pain, shortness of breath, abdominal pain, dysuria, hematuria, diarrhea, constipation.      in ED CT head showed scattered areas of subarachnoid hemorrhage along both hemispheres right greater than left. No subdural or epidural hematoma.  Repeat Ct head showed similar findings with improvement . Tele Neuro surgery was consulted. patient was closely monitored , patient condition improved. patient was evaluated by PT that recommended home PT with a walker.   ·      INTERVAL HPI/OVERNIGHT EVENTS:   patient examined bed side, she is comfortable in bed, AAOX3 , CT head was repeated and improved, neurology evaluated the patient and patient is for discharge , PT eval recommended home PT with a walker, patient doesnot want to be discharged today and will go tommorrow, 24 hour notice was given.       REVIEW OF SYSTEMS:  CONSTITUTIONAL: No fever, weight loss, or fatigue  RESPIRATORY: No cough, wheezing, chills or hemoptysis; No shortness of breath  CARDIOVASCULAR: No chest pain, palpitations, dizziness, or leg swelling  GASTROINTESTINAL: No abdominal pain. No nausea, vomiting, or hematemesis; No diarrhea or constipation. No melena or hematochezia.  GENITOURINARY: No dysuria or hematuria, urinary frequency  NEUROLOGICAL: No headaches, memory loss, loss of strength, numbness, or tremors  SKIN: No itching, burning, rashes, or lesions     MEDICATIONS  (STANDING):  pantoprazole  Injectable 40 milliGRAM(s) IV Push two times a day    MEDICATIONS  (PRN):  ondansetron Injectable 4 milliGRAM(s) IV Push every 6 hours PRN Nausea and/or Vomiting      Vital Signs Last 24 Hrs  T(C): 36.5 (22 Jan 2021 14:40), Max: 37.1 (22 Jan 2021 02:17)  T(F): 97.7 (22 Jan 2021 14:40), Max: 98.8 (22 Jan 2021 02:17)  HR: 87 (22 Jan 2021 14:40) (69 - 92)  BP: 136/66 (22 Jan 2021 14:40) (123/50 - 155/88)  BP(mean): 100 (21 Jan 2021 19:30) (98 - 100)  RR: 18 (22 Jan 2021 14:40) (16 - 18)  SpO2: 98% (22 Jan 2021 14:40) (94% - 99%)    PHYSICAL EXAMINATION:  GENERAL: NAD,   HEAD: ecchymosis and laceration  above left eye  EYES:  ecchymosis and laceration  above left eye    NECK: Supple, No JVD, Normal thyroid  CHEST/LUNG: Clear to auscultation. Clear to percussion bilaterally; No rales, rhonchi, wheezing, or rubs  HEART: Regular rate and rhythm; No murmurs, rubs, or gallops  ABDOMEN: Soft, Nontender, Nondistended; Bowel sounds present  NERVOUS SYSTEM:  Alert & Oriented X3,    EXTREMITIES:  2+ Peripheral Pulses, No clubbing, cyanosis, or edema  SKIN: warm dry                          12.6   10.28 )-----------( 177      ( 21 Jan 2021 04:55 )             38.8     01-21    139  |  107  |  16  ----------------------------<  111<H>  3.8   |  23  |  0.62    Ca    8.6      21 Jan 2021 04:55  Phos  2.6     01-21  Mg     2.2     01-21    TPro  7.5  /  Alb  3.5  /  TBili  0.5  /  DBili  x   /  AST  18  /  ALT  20  /  AlkPhos  64  01-21    LIVER FUNCTIONS - ( 21 Jan 2021 04:55 )  Alb: 3.5 g/dL / Pro: 7.5 g/dL / ALK PHOS: 64 U/L / ALT: 20 U/L DA / AST: 18 U/L / GGT: x           CARDIAC MARKERS ( 21 Jan 2021 04:55 )  <0.015 ng/mL / x     / 115 U/L / x     / 1.9 ng/mL  CARDIAC MARKERS ( 20 Jan 2021 17:40 )  <0.015 ng/mL / x     / x     / x     / x          PT/INR - ( 21 Jan 2021 04:55 )   PT: 14.4 sec;   INR: 1.22 ratio         PTT - ( 20 Jan 2021 19:10 )  PTT:24.7 sec        CAPILLARY BLOOD GLUCOSE  CAPILLARY BLOOD GLUCOSE        CAPILLARY BLOOD GLUCOSE          RADIOLOGY & ADDITIONAL TESTS:

## 2021-01-22 NOTE — PROGRESS NOTE ADULT - PROBLEM SELECTOR PLAN 1
Presented after a fall, hit head, had laceration of left eyelid  Has ecchymosis of left eyelid  Patient is lethargic at present, received haldol and ativan in ed earlier as she was walking around.  CT head showed subarachnoid hgg, repeat Ct was done after few hours, which shows stable hgg compared to previous.   neuro Dr Ovalle  repeat CT improved  PT recommended home PT w a walker.

## 2021-01-22 NOTE — PROGRESS NOTE ADULT - PROBLEM SELECTOR PLAN 2
Presented after a fall  She states she tripped, no prodromal symptom  Has ecchymosis of left eyelid  PT recommended home PT w a walker.

## 2021-01-22 NOTE — DISCHARGE NOTE PROVIDER - NSDCCPCAREPLAN_GEN_ALL_CORE_FT
PRINCIPAL DISCHARGE DIAGNOSIS  Diagnosis: Subarachnoid hemorrhage  Assessment and Plan of Treatment: You presented after a fall with echymosis of left eye and confusion.   in ED CT head showed scattered areas of subarachnoid hemorrhage along both hemispheres right greater than left. No subdural or epidural hematoma.  Repeat Ct head showed similar findings with improvement . Tele Neuro surgery was consulted. You were closely monitored ,YOur condition improved. You were evaluated by PT that recommended home PT with a walker. You are recommended to follow up with neurology Dr Ovalle after discharge.      SECONDARY DISCHARGE DIAGNOSES  Diagnosis: Facial laceration  Assessment and Plan of Treatment: You presented after a fall with echymosis of left eye and confusion.   in ED CT head showed scattered areas of subarachnoid hemorrhage along both hemispheres right greater than left. No subdural or epidural hematoma.  Repeat Ct head showed similar findings with improvement . Tele Neuro surgery was consulted. You were closely monitored ,YOur condition improved. You were evaluated by PT that recommended home PT with a walker. You are recommended to follow up with neurology Dr Ovalle after discharge.

## 2021-01-22 NOTE — PROGRESS NOTE ADULT - ASSESSMENT
Reviewed CT images with Dr Swift. Recommend walker use to prevent falls in the future. Significant parietal atrophy on CT - consistent with neurodegenerative process that is likely the explanation for the postural disorder predisposing to falls. Follow up in the office
79 y/o female from home, lives with a friend, no family, independent, with no significant medical history and no significant PSHx presents to the ED with c/o mechanical fall after tripping on a pebble today when walking on street.      ED:  CT head showed scattered areas of subarachnoid hemorrhage along both hemispheres right greater than left. No subdural or epidural hematoma.  Repeat Ct head showed similar findings.  Tele Neuro surgery was consulted. As per ED Attending note "discussed with trauma, dr. Spangler and will discuss with neurosurgery [Dr. Ash] neurosurgery will come to  to eval tomorrow".  She received haldol and ativan in ED as she was talking more.     Patient is being admitted to tele for monitoring of Subarachnoid hemorrhage.

## 2021-01-22 NOTE — PROGRESS NOTE ADULT - SUBJECTIVE AND OBJECTIVE BOX
INTERVAL HPI/OVERNIGHT EVENTS:   No new events; feels better and feels she can walk better      REVIEW OF SYSTEMS:  CONSTITUTIONAL: No fever, weight loss, or fatigue  RESPIRATORY: No cough, wheezing, chills or hemoptysis; No shortness of breath  CARDIOVASCULAR: No chest pain, palpitations, dizziness, or leg swelling  GASTROINTESTINAL: No abdominal pain. No nausea, vomiting, or hematemesis; No diarrhea or constipation. No melena or hematochezia.  GENITOURINARY: No dysuria or hematuria, urinary frequency  NEUROLOGICAL: No headaches, memory loss, loss of strength, numbness, or tremors  SKIN: No itching, burning, rashes, or lesions     MEDICATIONS  (STANDING):  pantoprazole  Injectable 40 milliGRAM(s) IV Push two times a day    MEDICATIONS  (PRN):  ondansetron Injectable 4 milliGRAM(s) IV Push every 6 hours PRN Nausea and/or Vomiting      Vital Signs Last 24 Hrs  T(C): 36.5 (22 Jan 2021 14:40), Max: 37.1 (22 Jan 2021 02:17)  T(F): 97.7 (22 Jan 2021 14:40), Max: 98.8 (22 Jan 2021 02:17)  HR: 87 (22 Jan 2021 14:40) (69 - 92)  BP: 136/66 (22 Jan 2021 14:40) (123/50 - 155/88)  BP(mean): 100 (21 Jan 2021 19:30) (98 - 100)  RR: 18 (22 Jan 2021 14:40) (16 - 18)  SpO2: 98% (22 Jan 2021 14:40) (94% - 99%)    PHYSICAL EXAMINATION:  GENERAL: NAD,   HEAD: ecchymosis and laceration  above left eye  EYES:  ecchymosis and laceration  above left eye  HEENT: PERRLA, EOMI,   Neck: Supple.  Respiratory: Breath sounds are clear bilaterally  Cardiovascular: S1 and S2, regular / rhythm  Gastrointestinal: soft, nontender  Extremities:  no edema  Vascular: Caritid Bruit - no  Musculoskeletal: no joint swelling/tenderness, no abnormal movements  Skin: No rashes    Neurological exam:  HF: A x O x 3. Appropriately interactive, normal affect. Speech fluent, No Aphasia or paraphasic errors. Naming /repetition intact   CN: AI, EOMI, VFF, facial sensation normal, no NLFD, tongue midline, Palate moves equally, SCM equal bilaterally  Motor: No pronator drift, Strength 5/5 in all 4 ext, normal bulk and tone, no tremor, rigidity or bradykinesia.    Sens: Intact to light touch / PP/ VS/    Reflexes: Symmetric and normal . BJ 2+, BR 2+, KJ 2+, AJ 1+, downgoing toes b/l  Coord:  No FNFA, dysmetria, ELVIE slow  Gait/Balance: Narrow stance short steps; propulsion and retropulsion + Romberg unsteady    NIHSS: 0                          12.6   10.28 )-----------( 177      ( 21 Jan 2021 04:55 )             38.8     01-21    139  |  107  |  16  ----------------------------<  111<H>  3.8   |  23  |  0.62    Ca    8.6      21 Jan 2021 04:55  Phos  2.6     01-21  Mg     2.2     01-21    TPro  7.5  /  Alb  3.5  /  TBili  0.5  /  DBili  x   /  AST  18  /  ALT  20  /  AlkPhos  64  01-21    LIVER FUNCTIONS - ( 21 Jan 2021 04:55 )  Alb: 3.5 g/dL / Pro: 7.5 g/dL / ALK PHOS: 64 U/L / ALT: 20 U/L DA / AST: 18 U/L / GGT: x           CARDIAC MARKERS ( 21 Jan 2021 04:55 )  <0.015 ng/mL / x     / 115 U/L / x     / 1.9 ng/mL  CARDIAC MARKERS ( 20 Jan 2021 17:40 )  <0.015 ng/mL / x     / x     / x     / x          PT/INR - ( 21 Jan 2021 04:55 )   PT: 14.4 sec;   INR: 1.22 ratio         PTT - ( 20 Jan 2021 19:10 )  PTT:24.7 sec        CAPILLARY BLOOD GLUCOSE  CAPILLARY BLOOD GLUCOSE        CAPILLARY BLOOD GLUCOSE          RADIOLOGY & ADDITIONAL TESTS:  < from: CT Head No Cont (01.22.21 @ 10:20) >    EXAM:  CT BRAIN                            PROCEDURE DATE:  01/22/2021          INTERPRETATION:  CT brain without contrast    History subarachnoid hemorrhage    Comparison consecutive examinations from the past 2 days    Trace subarachnoid hemorrhage previously reported in the left sylvian fissure and the cerebral convexities has near completely resolved. There is no new hemorrhage or cortical edema, mass effect or hydrocephalus.    IMPRESSION:  Improved        ANA MILLAN MD; Attending Radiologist  This document has been electronically signed. Jan 22 2021 10:24AM    < end of copied text >

## 2021-01-23 ENCOUNTER — TRANSCRIPTION ENCOUNTER (OUTPATIENT)
Age: 81
End: 2021-01-23

## 2021-01-23 VITALS
TEMPERATURE: 99 F | OXYGEN SATURATION: 98 % | SYSTOLIC BLOOD PRESSURE: 127 MMHG | DIASTOLIC BLOOD PRESSURE: 85 MMHG | RESPIRATION RATE: 18 BRPM | HEART RATE: 89 BPM

## 2021-01-23 NOTE — DISCHARGE NOTE NURSING/CASE MANAGEMENT/SOCIAL WORK - PATIENT PORTAL LINK FT
You can access the FollowMyHealth Patient Portal offered by [facility] by registering at the following website: [email or web address]. By joining [employer]’s FollowMyHealth portal, you will also be able to view your health information using other applications (apps) compatible with our system.

## 2021-04-08 NOTE — ED ADULT TRIAGE NOTE - ESI TRIAGE ACUITY LEVEL, MLM
3 Metronidazole Pregnancy And Lactation Text: This medication is Pregnancy Category B and considered safe during pregnancy.  It is also excreted in breast milk.

## 2023-05-22 NOTE — PHYSICAL THERAPY INITIAL EVALUATION ADULT - GENERAL OBSERVATIONS, REHAB EVAL
What Type Of Note Output Would You Prefer (Optional)?: Bullet Format
How Severe Is Your Rash?: mild
Is This A New Presentation, Or A Follow-Up?: Rash
pt emr review, +ct head 1/21, left eye ecchymosis/eye lid laceration/butterfly plaster, fairly I adl transfers, resolving I ambulation with rw support
